# Patient Record
Sex: FEMALE | Race: BLACK OR AFRICAN AMERICAN | Employment: FULL TIME | URBAN - METROPOLITAN AREA
[De-identification: names, ages, dates, MRNs, and addresses within clinical notes are randomized per-mention and may not be internally consistent; named-entity substitution may affect disease eponyms.]

---

## 2022-09-15 ENCOUNTER — HOSPITAL ENCOUNTER (EMERGENCY)
Age: 27
Discharge: HOME | End: 2022-09-15
Attending: EMERGENCY MEDICINE
Payer: COMMERCIAL

## 2022-09-15 VITALS
DIASTOLIC BLOOD PRESSURE: 86 MMHG | TEMPERATURE: 98.1 F | SYSTOLIC BLOOD PRESSURE: 140 MMHG | RESPIRATION RATE: 18 BRPM | BODY MASS INDEX: 44.3 KG/M2 | HEART RATE: 101 BPM | OXYGEN SATURATION: 100 % | WEIGHT: 250 LBS | HEIGHT: 63 IN
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
## 2022-09-15 PROCEDURE — 99284 EMERGENCY DEPT VISIT MOD MDM: CPT

## 2022-09-15 PROCEDURE — 90471 IMMUNIZATION ADMIN: CPT | Performed by: STUDENT IN AN ORGANIZED HEALTH CARE EDUCATION/TRAINING PROGRAM

## 2022-09-15 PROCEDURE — 6370000000 HC RX 637 (ALT 250 FOR IP): Performed by: STUDENT IN AN ORGANIZED HEALTH CARE EDUCATION/TRAINING PROGRAM

## 2022-09-15 PROCEDURE — 90715 TDAP VACCINE 7 YRS/> IM: CPT | Performed by: STUDENT IN AN ORGANIZED HEALTH CARE EDUCATION/TRAINING PROGRAM

## 2022-09-15 PROCEDURE — 6360000002 HC RX W HCPCS: Performed by: STUDENT IN AN ORGANIZED HEALTH CARE EDUCATION/TRAINING PROGRAM

## 2022-09-15 RX ORDER — AMOXICILLIN AND CLAVULANATE POTASSIUM 875; 125 MG/1; MG/1
1 TABLET, FILM COATED ORAL 2 TIMES DAILY
Qty: 20 TABLET | Refills: 0 | Status: SHIPPED | OUTPATIENT
Start: 2022-09-15 | End: 2022-09-25

## 2022-09-15 RX ORDER — IBUPROFEN 800 MG/1
800 TABLET ORAL ONCE
Status: COMPLETED | OUTPATIENT
Start: 2022-09-15 | End: 2022-09-15

## 2022-09-15 RX ORDER — AMOXICILLIN AND CLAVULANATE POTASSIUM 875; 125 MG/1; MG/1
1 TABLET, FILM COATED ORAL EVERY 12 HOURS SCHEDULED
Status: DISCONTINUED | OUTPATIENT
Start: 2022-09-15 | End: 2022-09-15 | Stop reason: HOSPADM

## 2022-09-15 RX ORDER — IBUPROFEN 800 MG/1
800 TABLET ORAL 2 TIMES DAILY PRN
Qty: 10 TABLET | Refills: 1 | Status: SHIPPED | OUTPATIENT
Start: 2022-09-15

## 2022-09-15 RX ADMIN — TETANUS TOXOID, REDUCED DIPHTHERIA TOXOID AND ACELLULAR PERTUSSIS VACCINE, ADSORBED 0.5 ML: 5; 2.5; 8; 8; 2.5 SUSPENSION INTRAMUSCULAR at 20:45

## 2022-09-15 RX ADMIN — AMOXICILLIN AND CLAVULANATE POTASSIUM 1 TABLET: 875; 125 TABLET, FILM COATED ORAL at 20:44

## 2022-09-15 RX ADMIN — IBUPROFEN 800 MG: 800 TABLET, FILM COATED ORAL at 20:44

## 2022-09-15 ASSESSMENT — ENCOUNTER SYMPTOMS
ABDOMINAL PAIN: 0
STRIDOR: 0
BACK PAIN: 0
VOMITING: 0
NAUSEA: 0
SHORTNESS OF BREATH: 0

## 2022-09-16 NOTE — CONSULTS
[facility] for animal bite. Patient with animal bite to right popliteal and calf area. Animal bite form completed and faxed to [facility] Department.

## 2022-09-16 NOTE — ED PROVIDER NOTES
[address] [facility]     Emergency Department     Faculty Attestation    I performed a history and physical examination of the patient and discussed management with the resident. I reviewed the residents note and agree with the documented findings including all diagnostic interpretations and plan of care. Any areas of disagreement are noted on the chart. I was personally present for the key portions of any procedures. I have documented in the chart those procedures where I was not present during the key portions. I have reviewed the emergency nurses triage note. I agree with the chief complaint, past medical history, past surgical history, allergies, medications, social and family history as documented unless otherwise noted below. Documentation of the HPI, Physical Exam and Medical Decision Making performed by [email or web address] is based on my personal performance of the HPI, PE and MDM. For Physician Assistant/ Nurse Practitioner cases/documentation I have personally evaluated this patient and have completed at least one if not all key elements of the E/M (history, physical exam, and MDM). Additional findings are as noted. Primary Care Physician: No primary care provider on file. History: This is a 23 y.o. female who presents to the Emergency Department with complaint of dog bite. Dog bite to the calf. Neighbors dog. Unclear on vaccination status. She does not believe she has had tetanus immunization within the past 10 years. No difficulty bearing weight. No blood thinners. No other injuries. Physical:     height is 5' 3\" (1.6 m) and weight is 250 lb (113.4 kg). Her oral temperature is 98.1 °F (36.7 °C). Her blood pressure is 140/86 (abnormal) and her pulse is 101 (abnormal). Her respiration is 18 and oxygen saturation is 100%.     23 y.o. female no acute distress, multiple superficial lacerations/superficial puncture wounds that appear consistent with dog bite over the superior half of the calf.   No ataxia, no asymmetry and evaluation of the gastrocs, no surrounding infection or drainage noted    Impression: Dog bite    Plan: Antibiotics, tetanus, washout      Ed MD Paty, Haleigh Gomez  Attending Emergency Physician        Debo Nina MD  09/15/22 2035

## 2022-09-16 NOTE — ED NOTES
This patient was assessed by the doctor only. Nurse processed and completed the orders from this doctor ie labs, meds, and/or EKG.       Aisha Cain, AURELIO  06/40/88 [id number]

## 2022-09-16 NOTE — ED PROVIDER NOTES
[facility] ED  Emergency Department Encounter  Emergency Medicine Resident     Pt Name: Serg Babcock  MRN: [id number]  [place] 1995  Date of evaluation: 9/15/22  PCP:  No primary care provider on file. CHIEF COMPLAINT       Chief Complaint   Patient presents with    Animal Bite     Dog bite about 1 hour ago by unknown dog. Not sure if dog was vaccinated. Bite to posterior left calf        HISTORY OFPRESENT ILLNESS  (Location/Symptom, Timing/Onset, Context/Setting, Quality, Duration, Modifying Factors,Severity.)      Serg Babcock is a 23 y. o.yo female who presents with bite over her left posterior knee. Patient reports that was the neighbors dog, unsure of dog's vaccination. Patient states that this happened about an hour ago prior to arrival.  Patient denies any numbness or tingling down her legs. Reports that she is able to ambulate. She denies any weakness. She states she has not take anything for the pain and she is unsure if her tetanus is up-to-date. PAST MEDICAL / SURGICAL / SOCIAL / FAMILY HISTORY      has no past medical history on file. has no past surgical history on file.      Social History     Socioeconomic History    Marital status: Single     Spouse name: Not on file    Number of children: Not on file    Years of education: Not on file    Highest education level: Not on file   Occupational History    Occupation: student   Tobacco Use    Smoking status: Never    Smokeless tobacco: Never   Substance and Sexual Activity    Alcohol use: No    Drug use: No    Sexual activity: Not on file   Other Topics Concern    Not on file   Social History Narrative    Not on file     Social Determinants of Health     Financial Resource Strain: Not on file   Food Insecurity: Not on file   Transportation Needs: Not on file   Physical Activity: Not on file   Stress: Not on file   Social Connections: Not on file   Intimate Partner Violence: Not on file   Housing Stability: Not on file Family History   Problem Relation Age of Onset    Asthma Mother     Diabetes Father     High Cholesterol Father     High Blood Pressure Father     Heart Disease Father     Heart Disease Maternal Grandmother         Allergies:  Patient has no known allergies. Home Medications:  Prior to Admission medications    Medication Sig Start Date End Date Taking? Authorizing Provider   amoxicillin-clavulanate (AUGMENTIN) 875-125 MG per tablet Take 1 tablet by mouth 2 times daily for 10 days 9/15/22 9/25/22 Yes Boston Bobo MD   ibuprofen (ADVIL;MOTRIN) 800 MG tablet Take 1 tablet by mouth 2 times daily as needed for Pain 9/15/22  Yes Boston Bobo MD       REVIEW OFSYSTEMS    (2-9 systems for level 4, 10 or more for level 5)      Review of Systems   Constitutional:  Negative for diaphoresis and fatigue. HENT:  Negative for dental problem and drooling. Respiratory:  Negative for shortness of breath and stridor. Cardiovascular:  Negative for chest pain and leg swelling. Gastrointestinal:  Negative for abdominal pain, nausea and vomiting. Genitourinary:  Negative for dysuria and enuresis. Musculoskeletal:  Negative for back pain and gait problem. Skin:  Positive for wound. Neurological:  Negative for light-headedness and headaches. Psychiatric/Behavioral:  Negative for behavioral problems and confusion. PHYSICAL EXAM   (up to 7 for level 4, 8 or more forlevel 5)      INITIAL VITALS:   ED Triage Vitals [09/15/22 1927]   BP Temp Temp Source Heart Rate Resp SpO2 Height Weight   (!) 140/86 98.1 °F (36.7 °C) Oral (!) 101 18 100 % 5' 3\" (1.6 m) 250 lb (113.4 kg)       Physical Exam  HENT:      Head: Normocephalic and atraumatic. Nose: Nose normal.      Mouth/Throat:      Mouth: Mucous membranes are moist.   Eyes:      Extraocular Movements: Extraocular movements intact. Pupils: Pupils are equal, round, and reactive to light. Cardiovascular:      Rate and Rhythm: Normal rate. cardiologist.    EMERGENCY DEPARTMENT COURSE:          PROCEDURES:  None    CONSULTS:  None    CRITICAL CARE:      FINAL IMPRESSION      1.  Dog bite, initial encounter          DISPOSITION / PLAN     DISPOSITION Decision To Discharge 09/15/2022 08:35:46 PM      PATIENT REFERRED TO:  [facility] ED  [address] [facility] [id number] [phone]    If symptoms worsen      DISCHARGE MEDICATIONS:  Discharge Medication List as of 9/15/2022  8:47 PM        START taking these medications    Details   amoxicillin-clavulanate (AUGMENTIN) 875-125 MG per tablet Take 1 tablet by mouth 2 times daily for 10 days, Disp-20 tablet, R-0Print      ibuprofen (ADVIL;MOTRIN) 800 MG tablet Take 1 tablet by mouth 2 times daily as needed for Pain, Disp-10 tablet, R-1Print             Yusra Field MD  Emergency Medicine Resident    (Please note that portions of this note were completed with a voice recognition program.Efforts were made to edit the dictations but occasionally words are mis-transcribed.)        Yusra Field MD  Resident  09/15/22 [id number]

## 2022-09-16 NOTE — DISCHARGE INSTRUCTIONS
Please make sure you take your antibiotic the dog bite. If at anytime you start noticing oozing, drainage of blood, redness around, you develop fever, chills, please return to emergency medicine as possible.   Please take Ibuprofen as needed for pain